# Patient Record
Sex: MALE | Race: WHITE | URBAN - METROPOLITAN AREA
[De-identification: names, ages, dates, MRNs, and addresses within clinical notes are randomized per-mention and may not be internally consistent; named-entity substitution may affect disease eponyms.]

---

## 2017-07-17 ENCOUNTER — IMPORTED ENCOUNTER (OUTPATIENT)
Dept: URBAN - METROPOLITAN AREA CLINIC 50 | Facility: CLINIC | Age: 77
End: 2017-07-17

## 2017-07-20 ENCOUNTER — IMPORTED ENCOUNTER (OUTPATIENT)
Dept: URBAN - METROPOLITAN AREA CLINIC 50 | Facility: CLINIC | Age: 77
End: 2017-07-20

## 2018-03-08 NOTE — PATIENT DISCUSSION
(N06.506) Keratoconjunct sicca, not specified as Sjogren's, bilateral - Assesment : Examination revealed Dry Eye Syndrome OU. - Plan : Monitor for changes. Advised patient to call our office with decreased vision or increased symptoms.

## 2018-03-08 NOTE — PATIENT DISCUSSION
(A08.116) Vitreous degeneration, left eye - Assesment : Examination revealed PVD superior to macula OS. No defects 360 degrees w/ wide angle lens OS. - Plan : Monitor for changes. Advised patient to call our office with decreased vision or an increase in flashes and/or floaters. RV PRN, sooner if symptoms worsen or vision changes.

## 2018-03-08 NOTE — PATIENT DISCUSSION
(H26.531) Other secondary cataract, right eye - Assesment : Posterior capsule opacification present. Patient is currently asymptomatic and functioning well. - Plan : Monitor for Changes. Advised patient to call our office with decreased vision or increased symptoms.

## 2018-09-27 ENCOUNTER — IMPORTED ENCOUNTER (OUTPATIENT)
Dept: URBAN - METROPOLITAN AREA CLINIC 50 | Facility: CLINIC | Age: 78
End: 2018-09-27

## 2019-09-26 ENCOUNTER — IMPORTED ENCOUNTER (OUTPATIENT)
Dept: URBAN - METROPOLITAN AREA CLINIC 50 | Facility: CLINIC | Age: 79
End: 2019-09-26

## 2019-10-10 ENCOUNTER — IMPORTED ENCOUNTER (OUTPATIENT)
Dept: URBAN - METROPOLITAN AREA CLINIC 50 | Facility: CLINIC | Age: 79
End: 2019-10-10

## 2019-11-21 ENCOUNTER — IMPORTED ENCOUNTER (OUTPATIENT)
Dept: URBAN - METROPOLITAN AREA CLINIC 50 | Facility: CLINIC | Age: 79
End: 2019-11-21

## 2019-12-06 ENCOUNTER — IMPORTED ENCOUNTER (OUTPATIENT)
Dept: URBAN - METROPOLITAN AREA CLINIC 50 | Facility: CLINIC | Age: 79
End: 2019-12-06

## 2020-10-30 ENCOUNTER — IMPORTED ENCOUNTER (OUTPATIENT)
Dept: URBAN - METROPOLITAN AREA CLINIC 50 | Facility: CLINIC | Age: 80
End: 2020-10-30

## 2021-04-18 ASSESSMENT — TONOMETRY
OD_IOP_MMHG: 12
OD_IOP_MMHG: 16
OS_IOP_MMHG: 18
OS_IOP_MMHG: 12
OS_IOP_MMHG: 18
OS_IOP_MMHG: 16
OS_IOP_MMHG: 12
OD_IOP_MMHG: 13
OD_IOP_MMHG: 14
OD_IOP_MMHG: 13
OD_IOP_MMHG: 12
OD_IOP_MMHG: 12
OS_IOP_MMHG: 13
OS_IOP_MMHG: 14

## 2021-04-18 ASSESSMENT — VISUAL ACUITY
OS_SC: 20/25
OS_BAT: 20/40-
OD_SC: 20/30
OD_SC: 20/30
OS_CC: J1+
OS_SC: 20/25
OS_PH: 20/30+2
OD_BAT: 20/40-
OS_SC: 20/20-1
OD_OTHER: 20/30. <20/400.
OS_SC: 20/30-1
OS_OTHER: 20/40. <20/400.
OD_SC: 20/30-
OD_CC: J1+
OS_OTHER: 20/50. 20/200.
OD_SC: 20/40
OD_CC: J1+
OD_BAT: 20/30
OD_BAT: 20/40
OD_SC: 20/20
OS_SC: 20/25-
OS_CC: J1+
OD_PH: 20/30-1
OS_CC: J1+
OS_CC: J1+
OD_OTHER: 20/40. 20/200.
OD_CC: J1+
OD_SC: 20/25
OD_CC: J1+
OS_BAT: 20/40
OS_BAT: 20/50
OS_SC: 20/25

## 2021-11-01 ENCOUNTER — PREPPED CHART (OUTPATIENT)
Dept: URBAN - METROPOLITAN AREA CLINIC 50 | Facility: CLINIC | Age: 81
End: 2021-11-01

## 2021-11-05 ENCOUNTER — COMPREHENSIVE EXAM (OUTPATIENT)
Dept: URBAN - METROPOLITAN AREA CLINIC 50 | Facility: CLINIC | Age: 81
End: 2021-11-05

## 2021-11-05 DIAGNOSIS — H35.371: ICD-10-CM

## 2021-11-05 DIAGNOSIS — H35.362: ICD-10-CM

## 2021-11-05 DIAGNOSIS — H35.033: ICD-10-CM

## 2021-11-05 DIAGNOSIS — H43.813: ICD-10-CM

## 2021-11-05 PROCEDURE — 92014 COMPRE OPH EXAM EST PT 1/>: CPT

## 2021-11-05 PROCEDURE — 92134 CPTRZ OPH DX IMG PST SGM RTA: CPT

## 2021-11-05 ASSESSMENT — TONOMETRY
OD_IOP_MMHG: 16
OS_IOP_MMHG: 16

## 2021-11-05 ASSESSMENT — VISUAL ACUITY
OU_SC: J12
OS_SC: 20/20-2
OD_SC: 20/25
OU_SC: 20/20

## 2023-01-27 ENCOUNTER — COMPREHENSIVE EXAM (OUTPATIENT)
Dept: URBAN - METROPOLITAN AREA CLINIC 50 | Facility: CLINIC | Age: 83
End: 2023-01-27

## 2023-01-27 DIAGNOSIS — H43.813: ICD-10-CM

## 2023-01-27 DIAGNOSIS — H35.362: ICD-10-CM

## 2023-01-27 DIAGNOSIS — H35.371: ICD-10-CM

## 2023-01-27 DIAGNOSIS — H35.033: ICD-10-CM

## 2023-01-27 PROCEDURE — 92014 COMPRE OPH EXAM EST PT 1/>: CPT

## 2023-01-27 PROCEDURE — 92134 CPTRZ OPH DX IMG PST SGM RTA: CPT

## 2023-01-27 ASSESSMENT — TONOMETRY
OS_IOP_MMHG: 15
OD_IOP_MMHG: 14

## 2023-01-27 ASSESSMENT — VISUAL ACUITY
OU_SC: 20/25-1
OU_CC: J1+
OS_SC: 20/25-2

## 2024-02-02 ENCOUNTER — COMPREHENSIVE EXAM (OUTPATIENT)
Dept: URBAN - METROPOLITAN AREA CLINIC 50 | Facility: LOCATION | Age: 84
End: 2024-02-02

## 2024-02-02 DIAGNOSIS — H35.362: ICD-10-CM

## 2024-02-02 DIAGNOSIS — H35.371: ICD-10-CM

## 2024-02-02 DIAGNOSIS — H02.834: ICD-10-CM

## 2024-02-02 DIAGNOSIS — H43.813: ICD-10-CM

## 2024-02-02 DIAGNOSIS — H02.831: ICD-10-CM

## 2024-02-02 DIAGNOSIS — H35.033: ICD-10-CM

## 2024-02-02 DIAGNOSIS — H04.123: ICD-10-CM

## 2024-02-02 PROCEDURE — 92134 CPTRZ OPH DX IMG PST SGM RTA: CPT

## 2024-02-02 PROCEDURE — 99214 OFFICE O/P EST MOD 30 MIN: CPT

## 2024-02-02 ASSESSMENT — TONOMETRY
OS_IOP_MMHG: 10
OD_IOP_MMHG: 11

## 2024-02-02 ASSESSMENT — VISUAL ACUITY
OD_SC: 20/20-1
OS_SC: 20/25-2

## 2025-02-07 ENCOUNTER — COMPREHENSIVE EXAM (OUTPATIENT)
Age: 85
End: 2025-02-07

## 2025-02-07 DIAGNOSIS — H35.033: ICD-10-CM

## 2025-02-07 DIAGNOSIS — H02.834: ICD-10-CM

## 2025-02-07 DIAGNOSIS — H11.152: ICD-10-CM

## 2025-02-07 DIAGNOSIS — H02.831: ICD-10-CM

## 2025-02-07 DIAGNOSIS — H43.813: ICD-10-CM

## 2025-02-07 DIAGNOSIS — H04.123: ICD-10-CM

## 2025-02-07 DIAGNOSIS — H11.441: ICD-10-CM

## 2025-02-07 PROCEDURE — 99214 OFFICE O/P EST MOD 30 MIN: CPT

## 2025-02-07 PROCEDURE — 92134 CPTRZ OPH DX IMG PST SGM RTA: CPT
